# Patient Record
Sex: FEMALE | Race: OTHER | NOT HISPANIC OR LATINO
[De-identification: names, ages, dates, MRNs, and addresses within clinical notes are randomized per-mention and may not be internally consistent; named-entity substitution may affect disease eponyms.]

---

## 2017-04-13 ENCOUNTER — RESULT REVIEW (OUTPATIENT)
Age: 22
End: 2017-04-13

## 2017-08-23 ENCOUNTER — APPOINTMENT (OUTPATIENT)
Dept: PSYCHIATRY | Facility: CLINIC | Age: 22
End: 2017-08-23
Payer: COMMERCIAL

## 2017-08-23 PROCEDURE — 99214 OFFICE O/P EST MOD 30 MIN: CPT

## 2017-09-20 ENCOUNTER — APPOINTMENT (OUTPATIENT)
Dept: PSYCHIATRY | Facility: CLINIC | Age: 22
End: 2017-09-20

## 2017-10-23 ENCOUNTER — APPOINTMENT (OUTPATIENT)
Dept: PSYCHIATRY | Facility: CLINIC | Age: 22
End: 2017-10-23
Payer: COMMERCIAL

## 2017-10-23 PROCEDURE — 99214 OFFICE O/P EST MOD 30 MIN: CPT

## 2017-11-15 ENCOUNTER — APPOINTMENT (OUTPATIENT)
Dept: PSYCHIATRY | Facility: CLINIC | Age: 22
End: 2017-11-15

## 2018-01-29 ENCOUNTER — APPOINTMENT (OUTPATIENT)
Dept: PSYCHIATRY | Facility: CLINIC | Age: 23
End: 2018-01-29
Payer: COMMERCIAL

## 2018-01-29 PROCEDURE — 99214 OFFICE O/P EST MOD 30 MIN: CPT

## 2018-02-23 ENCOUNTER — APPOINTMENT (OUTPATIENT)
Dept: PSYCHIATRY | Facility: CLINIC | Age: 23
End: 2018-02-23
Payer: COMMERCIAL

## 2018-02-23 PROCEDURE — 99214 OFFICE O/P EST MOD 30 MIN: CPT

## 2018-05-18 ENCOUNTER — APPOINTMENT (OUTPATIENT)
Dept: PSYCHIATRY | Facility: CLINIC | Age: 23
End: 2018-05-18

## 2018-07-18 ENCOUNTER — APPOINTMENT (OUTPATIENT)
Dept: PSYCHIATRY | Facility: CLINIC | Age: 23
End: 2018-07-18
Payer: COMMERCIAL

## 2018-07-18 PROCEDURE — 99214 OFFICE O/P EST MOD 30 MIN: CPT

## 2018-07-19 ENCOUNTER — APPOINTMENT (OUTPATIENT)
Dept: PSYCHIATRY | Facility: CLINIC | Age: 23
End: 2018-07-19
Payer: COMMERCIAL

## 2018-07-19 PROCEDURE — 90791 PSYCH DIAGNOSTIC EVALUATION: CPT

## 2018-07-25 ENCOUNTER — APPOINTMENT (OUTPATIENT)
Dept: PSYCHIATRY | Facility: CLINIC | Age: 23
End: 2018-07-25
Payer: COMMERCIAL

## 2018-07-25 PROCEDURE — 90837 PSYTX W PT 60 MINUTES: CPT

## 2018-08-23 ENCOUNTER — APPOINTMENT (OUTPATIENT)
Dept: PSYCHIATRY | Facility: CLINIC | Age: 23
End: 2018-08-23

## 2018-08-30 ENCOUNTER — APPOINTMENT (OUTPATIENT)
Dept: PSYCHIATRY | Facility: CLINIC | Age: 23
End: 2018-08-30

## 2018-09-06 ENCOUNTER — APPOINTMENT (OUTPATIENT)
Dept: PSYCHIATRY | Facility: CLINIC | Age: 23
End: 2018-09-06

## 2018-10-10 ENCOUNTER — APPOINTMENT (OUTPATIENT)
Dept: PSYCHIATRY | Facility: CLINIC | Age: 23
End: 2018-10-10
Payer: COMMERCIAL

## 2018-10-10 PROCEDURE — 99214 OFFICE O/P EST MOD 30 MIN: CPT

## 2019-01-11 ENCOUNTER — APPOINTMENT (OUTPATIENT)
Dept: PSYCHIATRY | Facility: CLINIC | Age: 24
End: 2019-01-11
Payer: COMMERCIAL

## 2019-01-11 PROCEDURE — 99214 OFFICE O/P EST MOD 30 MIN: CPT

## 2019-04-29 ENCOUNTER — APPOINTMENT (OUTPATIENT)
Dept: PSYCHIATRY | Facility: CLINIC | Age: 24
End: 2019-04-29
Payer: COMMERCIAL

## 2019-04-29 PROCEDURE — 99214 OFFICE O/P EST MOD 30 MIN: CPT

## 2019-07-29 ENCOUNTER — APPOINTMENT (OUTPATIENT)
Dept: PSYCHIATRY | Facility: CLINIC | Age: 24
End: 2019-07-29
Payer: COMMERCIAL

## 2019-07-29 PROCEDURE — 99214 OFFICE O/P EST MOD 30 MIN: CPT

## 2019-08-27 ENCOUNTER — APPOINTMENT (OUTPATIENT)
Dept: PSYCHIATRY | Facility: CLINIC | Age: 24
End: 2019-08-27
Payer: COMMERCIAL

## 2019-08-27 PROCEDURE — 99214 OFFICE O/P EST MOD 30 MIN: CPT

## 2019-10-14 ENCOUNTER — APPOINTMENT (OUTPATIENT)
Dept: PSYCHIATRY | Facility: CLINIC | Age: 24
End: 2019-10-14
Payer: COMMERCIAL

## 2019-10-14 PROCEDURE — 99214 OFFICE O/P EST MOD 30 MIN: CPT

## 2019-12-24 ENCOUNTER — APPOINTMENT (OUTPATIENT)
Dept: PSYCHIATRY | Facility: CLINIC | Age: 24
End: 2019-12-24

## 2019-12-26 ENCOUNTER — APPOINTMENT (OUTPATIENT)
Dept: PSYCHIATRY | Facility: CLINIC | Age: 24
End: 2019-12-26
Payer: COMMERCIAL

## 2019-12-26 PROCEDURE — 99214 OFFICE O/P EST MOD 30 MIN: CPT

## 2020-02-10 ENCOUNTER — OUTPATIENT (OUTPATIENT)
Dept: OUTPATIENT SERVICES | Facility: HOSPITAL | Age: 25
LOS: 1 days | End: 2020-02-10
Payer: COMMERCIAL

## 2020-02-10 VITALS
DIASTOLIC BLOOD PRESSURE: 67 MMHG | WEIGHT: 190.04 LBS | HEIGHT: 68 IN | SYSTOLIC BLOOD PRESSURE: 105 MMHG | TEMPERATURE: 98 F | OXYGEN SATURATION: 98 % | RESPIRATION RATE: 14 BRPM | HEART RATE: 54 BPM

## 2020-02-10 DIAGNOSIS — M24.272 DISORDER OF LIGAMENT, LEFT ANKLE: ICD-10-CM

## 2020-02-10 DIAGNOSIS — D18.00 HEMANGIOMA UNSPECIFIED SITE: Chronic | ICD-10-CM

## 2020-02-10 DIAGNOSIS — Z01.818 ENCOUNTER FOR OTHER PREPROCEDURAL EXAMINATION: ICD-10-CM

## 2020-02-10 DIAGNOSIS — Z98.890 OTHER SPECIFIED POSTPROCEDURAL STATES: Chronic | ICD-10-CM

## 2020-02-10 DIAGNOSIS — M25.372 OTHER INSTABILITY, LEFT ANKLE: ICD-10-CM

## 2020-02-10 PROCEDURE — G0463: CPT

## 2020-02-10 RX ORDER — LIDOCAINE HCL 20 MG/ML
0.2 VIAL (ML) INJECTION ONCE
Refills: 0 | Status: DISCONTINUED | OUTPATIENT
Start: 2020-02-13 | End: 2020-03-03

## 2020-02-10 RX ORDER — SODIUM CHLORIDE 9 MG/ML
3 INJECTION INTRAMUSCULAR; INTRAVENOUS; SUBCUTANEOUS EVERY 8 HOURS
Refills: 0 | Status: DISCONTINUED | OUTPATIENT
Start: 2020-02-13 | End: 2020-03-03

## 2020-02-10 NOTE — H&P PST ADULT - NEUROLOGICAL DETAILS
normal strength/responds to verbal commands/responds to pain/sensation intact/alert and oriented x 3

## 2020-02-10 NOTE — H&P PST ADULT - NSICDXPROBLEM_GEN_ALL_CORE_FT
PROBLEM DIAGNOSES  Problem: Disorder of ligament, left ankle  Assessment and Plan: Scheduled for repar of anterior talofibular ligament and calcaneofibular left ankle with augmentation.  Preop instructions given.  Chlorhexidine to affected area preop  POCT, urine for pregnancy upon admission to [facility]

## 2020-02-10 NOTE — H&P PST ADULT - NSICDXPASTSURGICALHX_GEN_ALL_CORE_FT
PAST SURGICAL HISTORY:  H/O ovarian cystectomy 2017    Hemangioendothelioma, benign X3, right pinky    History of bunionectomy right foot

## 2020-02-10 NOTE — H&P PST ADULT - NSANTHOSAYNRD_GEN_A_CORE
No. PRASHANTH screening performed.  STOP BANG Legend: 0-2 = LOW Risk; 3-4 = INTERMEDIATE Risk; 5-8 = HIGH Risk

## 2020-02-10 NOTE — H&P PST ADULT - HISTORY OF PRESENT ILLNESS
Ms. Ochoa is a 24 year old woman with PMH depression with anxiety and bipolar depression on Lithium who sprained left ankle the end of November 2019 worsening of pain over the last two months now scheduled for repair of anterior talofibular ligament and calcaneofibular left ankle with augmentation.

## 2020-02-12 ENCOUNTER — TRANSCRIPTION ENCOUNTER (OUTPATIENT)
Age: 25
End: 2020-02-12

## 2020-02-13 ENCOUNTER — OUTPATIENT (OUTPATIENT)
Dept: OUTPATIENT SERVICES | Facility: HOSPITAL | Age: 25
LOS: 1 days | End: 2020-02-13
Payer: COMMERCIAL

## 2020-02-13 VITALS
HEART RATE: 61 BPM | WEIGHT: 190.04 LBS | RESPIRATION RATE: 16 BRPM | OXYGEN SATURATION: 100 % | HEIGHT: 68 IN | SYSTOLIC BLOOD PRESSURE: 103 MMHG | DIASTOLIC BLOOD PRESSURE: 70 MMHG | TEMPERATURE: 98 F

## 2020-02-13 VITALS
RESPIRATION RATE: 16 BRPM | OXYGEN SATURATION: 100 % | HEART RATE: 54 BPM | SYSTOLIC BLOOD PRESSURE: 113 MMHG | TEMPERATURE: 99 F | DIASTOLIC BLOOD PRESSURE: 67 MMHG

## 2020-02-13 DIAGNOSIS — D18.00 HEMANGIOMA UNSPECIFIED SITE: Chronic | ICD-10-CM

## 2020-02-13 DIAGNOSIS — M24.272 DISORDER OF LIGAMENT, LEFT ANKLE: ICD-10-CM

## 2020-02-13 DIAGNOSIS — S93.402A SPRAIN OF UNSPECIFIED LIGAMENT OF LEFT ANKLE, INITIAL ENCOUNTER: ICD-10-CM

## 2020-02-13 DIAGNOSIS — Z98.890 OTHER SPECIFIED POSTPROCEDURAL STATES: Chronic | ICD-10-CM

## 2020-02-13 DIAGNOSIS — Z01.818 ENCOUNTER FOR OTHER PREPROCEDURAL EXAMINATION: ICD-10-CM

## 2020-02-13 DIAGNOSIS — M25.372 OTHER INSTABILITY, LEFT ANKLE: ICD-10-CM

## 2020-02-13 PROCEDURE — C1889: CPT

## 2020-02-13 PROCEDURE — C1713: CPT

## 2020-02-13 PROCEDURE — 27695 REPAIR OF ANKLE LIGAMENT: CPT | Mod: LT

## 2020-02-13 RX ORDER — CEFAZOLIN SODIUM 1 G
2000 VIAL (EA) INJECTION ONCE
Refills: 0 | Status: COMPLETED | OUTPATIENT
Start: 2020-02-13 | End: 2020-02-13

## 2020-02-13 RX ORDER — ACETAMINOPHEN 500 MG
1000 TABLET ORAL ONCE
Refills: 0 | Status: COMPLETED | OUTPATIENT
Start: 2020-02-13 | End: 2020-02-13

## 2020-02-13 RX ORDER — SODIUM CHLORIDE 9 MG/ML
1000 INJECTION, SOLUTION INTRAVENOUS
Refills: 0 | Status: DISCONTINUED | OUTPATIENT
Start: 2020-02-13 | End: 2020-03-03

## 2020-02-13 RX ORDER — ONDANSETRON 8 MG/1
4 TABLET, FILM COATED ORAL ONCE
Refills: 0 | Status: DISCONTINUED | OUTPATIENT
Start: 2020-02-13 | End: 2020-03-03

## 2020-02-13 RX ORDER — CELECOXIB 200 MG/1
200 CAPSULE ORAL ONCE
Refills: 0 | Status: COMPLETED | OUTPATIENT
Start: 2020-02-13 | End: 2020-02-13

## 2020-02-13 RX ORDER — CHLORHEXIDINE GLUCONATE 213 G/1000ML
1 SOLUTION TOPICAL ONCE
Refills: 0 | Status: COMPLETED | OUTPATIENT
Start: 2020-02-13 | End: 2020-02-13

## 2020-02-13 RX ORDER — OXYCODONE HYDROCHLORIDE 5 MG/1
5 TABLET ORAL ONCE
Refills: 0 | Status: DISCONTINUED | OUTPATIENT
Start: 2020-02-13 | End: 2020-02-13

## 2020-02-13 RX ADMIN — Medication 1000 MILLIGRAM(S): at 07:43

## 2020-02-13 RX ADMIN — CHLORHEXIDINE GLUCONATE 1 APPLICATION(S): 213 SOLUTION TOPICAL at 07:43

## 2020-02-13 RX ADMIN — CELECOXIB 200 MILLIGRAM(S): 200 CAPSULE ORAL at 07:43

## 2020-02-13 NOTE — ASU DISCHARGE PLAN (ADULT/PEDIATRIC) - CALL YOUR DOCTOR IF YOU HAVE ANY OF THE FOLLOWING:
Numbness, tingling, color or temperature change to extremity/Pain not relieved by Medications/Swelling that gets worse/Fever greater than (need to indicate Fahrenheit or Celsius)/Bleeding that does not stop

## 2020-02-13 NOTE — ASU PATIENT PROFILE, ADULT - PSH
H/O ovarian cystectomy  2017  Hemangioendothelioma, benign  X3, right pinky  History of bunionectomy  right foot

## 2020-02-13 NOTE — ASU DISCHARGE PLAN (ADULT/PEDIATRIC) - ASU DC SPECIAL INSTRUCTIONSFT
Keep left leg splint clean, dry, and intact until follow up visit  Nonweightbearing to L leg at all times  Take pain meds as needed, (sent to pharmacy)  Follow up w/ Dr. Solomon within 1 week

## 2020-03-23 ENCOUNTER — APPOINTMENT (OUTPATIENT)
Dept: PSYCHIATRY | Facility: CLINIC | Age: 25
End: 2020-03-23
Payer: COMMERCIAL

## 2020-03-23 PROCEDURE — 99443: CPT

## 2020-06-22 ENCOUNTER — APPOINTMENT (OUTPATIENT)
Dept: PSYCHIATRY | Facility: CLINIC | Age: 25
End: 2020-06-22

## 2020-07-23 ENCOUNTER — APPOINTMENT (OUTPATIENT)
Dept: PSYCHIATRY | Facility: CLINIC | Age: 25
End: 2020-07-23
Payer: COMMERCIAL

## 2020-07-23 PROCEDURE — 99214 OFFICE O/P EST MOD 30 MIN: CPT

## 2020-09-17 ENCOUNTER — APPOINTMENT (OUTPATIENT)
Dept: PSYCHIATRY | Facility: CLINIC | Age: 25
End: 2020-09-17

## 2020-09-21 ENCOUNTER — APPOINTMENT (OUTPATIENT)
Dept: PSYCHIATRY | Facility: CLINIC | Age: 25
End: 2020-09-21
Payer: COMMERCIAL

## 2020-09-21 PROCEDURE — 99214 OFFICE O/P EST MOD 30 MIN: CPT

## 2020-11-02 LAB
24R-OH-CALCIDIOL SERPL-MCNC: 56.7 PG/ML
ALBUMIN SERPL ELPH-MCNC: 4.5 G/DL
ALP BLD-CCNC: 74 U/L
ALT SERPL-CCNC: 11 U/L
ANION GAP SERPL CALC-SCNC: 10 MMOL/L
AST SERPL-CCNC: 13 U/L
BASOPHILS # BLD AUTO: 0.05 K/UL
BASOPHILS NFR BLD AUTO: 0.7 %
BILIRUB SERPL-MCNC: 0.6 MG/DL
BUN SERPL-MCNC: 13 MG/DL
CALCIUM SERPL-MCNC: 9.8 MG/DL
CHLORIDE SERPL-SCNC: 106 MMOL/L
CHOLEST SERPL-MCNC: 148 MG/DL
CO2 SERPL-SCNC: 22 MMOL/L
CREAT SERPL-MCNC: 0.78 MG/DL
EOSINOPHIL # BLD AUTO: 0.19 K/UL
EOSINOPHIL NFR BLD AUTO: 2.7 %
ESTIMATED AVERAGE GLUCOSE: 82 MG/DL
GLUCOSE SERPL-MCNC: 83 MG/DL
HBA1C MFR BLD HPLC: 4.5 %
HCT VFR BLD CALC: 40 %
HDLC SERPL-MCNC: 64 MG/DL
HGB BLD-MCNC: 13.8 G/DL
IMM GRANULOCYTES NFR BLD AUTO: 0.3 %
LDLC SERPL CALC-MCNC: 75 MG/DL
LITHIUM SERPL-SCNC: 0.79 MMOL/L
LYMPHOCYTES # BLD AUTO: 1.99 K/UL
LYMPHOCYTES NFR BLD AUTO: 28.3 %
MAN DIFF?: NORMAL
MCHC RBC-ENTMCNC: 29.9 PG
MCHC RBC-ENTMCNC: 34.5 GM/DL
MCV RBC AUTO: 86.8 FL
MONOCYTES # BLD AUTO: 0.62 K/UL
MONOCYTES NFR BLD AUTO: 8.8 %
NEUTROPHILS # BLD AUTO: 4.16 K/UL
NEUTROPHILS NFR BLD AUTO: 59.2 %
NONHDLC SERPL-MCNC: 83 MG/DL
PLATELET # BLD AUTO: 357 K/UL
POTASSIUM SERPL-SCNC: 4.4 MMOL/L
PROT SERPL-MCNC: 7.2 G/DL
RBC # BLD: 4.61 M/UL
RBC # FLD: 11.8 %
SODIUM SERPL-SCNC: 138 MMOL/L
TRIGL SERPL-MCNC: 40 MG/DL
TSH SERPL-ACNC: 1.56 UIU/ML
WBC # FLD AUTO: 7.03 K/UL

## 2020-11-12 ENCOUNTER — APPOINTMENT (OUTPATIENT)
Dept: OBGYN | Facility: CLINIC | Age: 25
End: 2020-11-12
Payer: COMMERCIAL

## 2020-11-12 VITALS
TEMPERATURE: 98.3 F | DIASTOLIC BLOOD PRESSURE: 80 MMHG | WEIGHT: 192 LBS | BODY MASS INDEX: 29.1 KG/M2 | HEIGHT: 68 IN | HEART RATE: 62 BPM | SYSTOLIC BLOOD PRESSURE: 121 MMHG

## 2020-11-12 DIAGNOSIS — Z87.42 PERSONAL HISTORY OF OTHER DISEASES OF THE FEMALE GENITAL TRACT: ICD-10-CM

## 2020-11-12 DIAGNOSIS — Z83.49 FAMILY HISTORY OF OTHER ENDOCRINE, NUTRITIONAL AND METABOLIC DISEASES: ICD-10-CM

## 2020-11-12 DIAGNOSIS — Z82.49 FAMILY HISTORY OF ISCHEMIC HEART DISEASE AND OTHER DISEASES OF THE CIRCULATORY SYSTEM: ICD-10-CM

## 2020-11-12 PROCEDURE — 99072 ADDL SUPL MATRL&STAF TM PHE: CPT

## 2020-11-12 PROCEDURE — 99385 PREV VISIT NEW AGE 18-39: CPT

## 2020-11-12 RX ORDER — NORETHINDRONE ACETATE AND ETHINYL ESTRADIOL AND FERROUS FUMARATE 1MG-20(21)
1-20 KIT ORAL
Qty: 28 | Refills: 0 | Status: DISCONTINUED | COMMUNITY
Start: 2017-11-21 | End: 2020-11-12

## 2020-11-16 LAB
C TRACH RRNA SPEC QL NAA+PROBE: NOT DETECTED
CANDIDA VAG CYTO: NOT DETECTED
CYTOLOGY CVX/VAG DOC THIN PREP: NORMAL
ESTIMATED AVERAGE GLUCOSE: 85 MG/DL
G VAGINALIS+PREV SP MTYP VAG QL MICRO: NOT DETECTED
HBA1C MFR BLD HPLC: 4.6 %
HBV SURFACE AG SER QL: NONREACTIVE
HCV AB SER QL: NONREACTIVE
HCV S/CO RATIO: 0.12 S/CO
HIV1+2 AB SPEC QL IA.RAPID: NONREACTIVE
N GONORRHOEA RRNA SPEC QL NAA+PROBE: NOT DETECTED
SOURCE AMPLIFICATION: NORMAL
T PALLIDUM AB SER QL IA: NEGATIVE
T VAGINALIS VAG QL WET PREP: NOT DETECTED

## 2020-12-04 NOTE — HISTORY OF PRESENT ILLNESS
[TextBox_4] : 25 y.o Presents for [facility] Woman GYN exam. No complaints. Sexually active with one male partner. Regular menses.\par Hx of Ovarian cyst, resolved 2018\par

## 2021-01-12 ENCOUNTER — NON-APPOINTMENT (OUTPATIENT)
Age: 26
End: 2021-01-12

## 2021-01-13 ENCOUNTER — NON-APPOINTMENT (OUTPATIENT)
Age: 26
End: 2021-01-13

## 2021-01-14 ENCOUNTER — NON-APPOINTMENT (OUTPATIENT)
Age: 26
End: 2021-01-14

## 2021-01-28 ENCOUNTER — APPOINTMENT (OUTPATIENT)
Dept: OBGYN | Facility: CLINIC | Age: 26
End: 2021-01-28
Payer: COMMERCIAL

## 2021-01-28 VITALS
BODY MASS INDEX: 28.64 KG/M2 | WEIGHT: 189 LBS | SYSTOLIC BLOOD PRESSURE: 110 MMHG | DIASTOLIC BLOOD PRESSURE: 73 MMHG | HEIGHT: 68 IN | HEART RATE: 82 BPM | TEMPERATURE: 97.9 F

## 2021-01-28 DIAGNOSIS — Z86.59 PERSONAL HISTORY OF OTHER MENTAL AND BEHAVIORAL DISORDERS: ICD-10-CM

## 2021-01-28 DIAGNOSIS — Z30.9 ENCOUNTER FOR CONTRACEPTIVE MANAGEMENT, UNSPECIFIED: ICD-10-CM

## 2021-01-28 PROCEDURE — 99213 OFFICE O/P EST LOW 20 MIN: CPT

## 2021-01-28 PROCEDURE — 99072 ADDL SUPL MATRL&STAF TM PHE: CPT

## 2021-02-12 NOTE — PRE-ANESTHESIA EVALUATION ADULT - WEIGHT IN KG
Subjective   Patient ID: Urmila is a 29 year old female.    Chief Complaint   Patient presents with   • PPD Reading   • PrePlacement Physical     Patient presents for a pre-employment physical and will be employed as  at [employer], where she has already been employed.  States UTD on vaccinations, no records available.   No past medical history.  No questions, concerns or complaints today.        No past medical history on file.    MEDICATIONS:  No current outpatient medications on file.     No current facility-administered medications for this visit.        ALLERGIES:  ALLERGIES:  No Known Allergies    PAST SURGICAL HISTORY:  No past surgical history on file.    FAMILY HISTORY:  No family history on file.    SOCIAL HISTORY:  Social History     Tobacco Use   • Smoking status: Not on file   Substance Use Topics   • Alcohol use: Not on file   • Drug use: Not on file         Patient's medications, allergies, past medical, surgical, social and family histories were reviewed and updated as appropriate.  Patient's medications, allergies, past medical, surgical, and social history  were reviewed and updated as appropriate.    Review of Systems   Constitutional: Negative.    HENT: Negative.    Eyes: Negative.    Respiratory: Negative.    Cardiovascular: Negative.    Gastrointestinal: Negative.    Genitourinary: Negative.    Musculoskeletal: Negative.    Skin: Negative.    Neurological: Negative.    Psychiatric/Behavioral: Negative.        Objective   Physical Exam  Vitals signs reviewed.   Constitutional:       Appearance: Normal appearance. She is well-developed.   HENT:      Head: Normocephalic and atraumatic.      Right Ear: Hearing, tympanic membrane, ear canal and external ear normal.      Left Ear: Hearing, tympanic membrane, ear canal and external ear normal.      Nose: Nose normal.      Mouth/Throat:      Pharynx: Uvula midline.   Eyes:      General: Lids are normal.      Conjunctiva/sclera:  Conjunctivae normal.      Pupils: Pupils are equal, round, and reactive to light.   Neck:      Musculoskeletal: Full passive range of motion without pain.   Cardiovascular:      Rate and Rhythm: Normal rate and regular rhythm.      Pulses:           Radial pulses are 2+ on the right side and 2+ on the left side.        Dorsalis pedis pulses are 2+ on the right side and 2+ on the left side.      Heart sounds: Normal heart sounds, S1 normal and S2 normal. No murmur.   Pulmonary:      Effort: Pulmonary effort is normal.      Breath sounds: Normal breath sounds.   Abdominal:      General: Bowel sounds are normal.      Palpations: Abdomen is soft.      Tenderness: There is no abdominal tenderness.   Genitourinary:     Comments: Deferred to PCP  Musculoskeletal:      Right shoulder: Normal.      Left shoulder: Normal.      Right wrist: Normal.      Left wrist: Normal.      Right knee: Normal.      Left knee: Normal.      Right ankle: Normal.      Left ankle: Normal.      Cervical back: Normal.      Thoracic back: Normal.      Lumbar back: Normal.   Lymphadenopathy:      Cervical: No cervical adenopathy.   Skin:     General: Skin is warm and dry.      Findings: No rash.   Neurological:      Mental Status: She is alert and oriented to person, place, and time.      Cranial Nerves: No cranial nerve deficit.      Sensory: No sensory deficit.      Deep Tendon Reflexes: Reflexes are normal and symmetric.   Psychiatric:         Speech: Speech normal.         Behavior: Behavior normal.       Visit Vitals  /84   Pulse 84   Temp 97.2 °F (36.2 °C)   Resp 18   Ht 5' 1\" (1.549 m)   Wt 68 kg (150 lb)   LMP 02/09/2021 (Exact Date)   SpO2 100%   BMI 28.34 kg/m²       Assessment   Problem List Items Addressed This Visit     None      Visit Diagnoses     Encounter for PPD skin test reading    -  Primary    Physical exam, pre-employment              This is a 29 year old year-old female who presents with for a pre-employment physical  and TB reading.   See procedure tab for result.    PRE-EMPLOYMENT PHYSICAL    - Patient is cleared for employment.  SEE SCANNED FORM for allergies, medications, social history, family history, PMH and PSH.  Pre-employment physical scanned into patient medical record.   - Patient states she UTD on vaccinations.  Should vaccines be required by employer, patient may return to clinic.  States they already have her records on file at employer.  - Form completed and given to patient.  Advised patient to continue to follow up with PCP for annual wellness examinations, preventative exams and lab work.    Instructions provided as documented in the AVS.    Thank you for visiting [facility].      Signed: Terrell Hong, CNP     86.2

## 2021-03-01 ENCOUNTER — APPOINTMENT (OUTPATIENT)
Dept: PSYCHIATRY | Facility: CLINIC | Age: 26
End: 2021-03-01
Payer: COMMERCIAL

## 2021-03-01 DIAGNOSIS — F32.9 MAJOR DEPRESSIVE DISORDER, SINGLE EPISODE, UNSPECIFIED: ICD-10-CM

## 2021-03-01 PROCEDURE — 99072 ADDL SUPL MATRL&STAF TM PHE: CPT

## 2021-03-01 PROCEDURE — 99214 OFFICE O/P EST MOD 30 MIN: CPT

## 2021-04-05 PROBLEM — Z86.59 HISTORY OF BIPOLAR DISORDER: Status: RESOLVED | Noted: 2021-04-05 | Resolved: 2021-04-05

## 2021-04-05 NOTE — HISTORY OF PRESENT ILLNESS
[FreeTextEntry1] : 25 y/o F on OCP for control of ovarian cysts here for f/u of birth control. Previously on Lo/lo estrin and insurance no longer covering. Would like to try other low dose medications. Fear of worsening depression with OCP. following with psychiatrist.

## 2021-07-23 RX ORDER — NORETHINDRONE ACETATE AND ETHINYL ESTRADIOL, ETHINYL ESTRADIOL AND FERROUS FUMARATE 1MG-10(24)
1 MG-10 MCG / KIT ORAL DAILY
Qty: 3 | Refills: 3 | Status: DISCONTINUED | COMMUNITY
Start: 2020-11-12 | End: 2021-07-23

## 2021-10-07 ENCOUNTER — APPOINTMENT (OUTPATIENT)
Dept: PSYCHIATRY | Facility: CLINIC | Age: 26
End: 2021-10-07
Payer: COMMERCIAL

## 2021-10-07 PROCEDURE — 99214 OFFICE O/P EST MOD 30 MIN: CPT

## 2021-11-14 ENCOUNTER — NON-APPOINTMENT (OUTPATIENT)
Age: 26
End: 2021-11-14

## 2021-11-15 ENCOUNTER — RESULT REVIEW (OUTPATIENT)
Age: 26
End: 2021-11-15

## 2021-11-15 ENCOUNTER — APPOINTMENT (OUTPATIENT)
Dept: OBGYN | Facility: CLINIC | Age: 26
End: 2021-11-15
Payer: COMMERCIAL

## 2021-11-15 VITALS
SYSTOLIC BLOOD PRESSURE: 97 MMHG | BODY MASS INDEX: 27.59 KG/M2 | HEIGHT: 68 IN | WEIGHT: 182.06 LBS | DIASTOLIC BLOOD PRESSURE: 65 MMHG | HEART RATE: 52 BPM

## 2021-11-15 DIAGNOSIS — N63.10 UNSPECIFIED LUMP IN THE RIGHT BREAST, UNSPECIFIED QUADRANT: ICD-10-CM

## 2021-11-15 DIAGNOSIS — Z01.419 ENCOUNTER FOR GYNECOLOGICAL EXAMINATION (GENERAL) (ROUTINE) W/OUT ABNORMAL FINDINGS: ICD-10-CM

## 2021-11-15 PROBLEM — Z87.42 HISTORY OF PCOS: Status: ACTIVE | Noted: 2021-11-15

## 2021-11-15 PROBLEM — Z82.49 FAMILY HISTORY OF HYPERTENSION: Status: ACTIVE | Noted: 2021-11-15

## 2021-11-15 PROBLEM — Z83.49 FAMILY HISTORY OF HYPERTHYROIDISM: Status: ACTIVE | Noted: 2021-11-15

## 2021-11-15 PROCEDURE — 99395 PREV VISIT EST AGE 18-39: CPT

## 2021-11-15 RX ORDER — NORETHINDRONE ACETATE AND ETHINYL ESTRADIOL AND FERROUS FUMARATE 1MG-20(21)
1-20 KIT ORAL DAILY
Qty: 1 | Refills: 5 | Status: DISCONTINUED | COMMUNITY
Start: 2021-01-28 | End: 2021-11-15

## 2021-11-15 NOTE — COUNSELING
[Nutrition/ Exercise/ Weight Management] : nutrition, exercise, weight management [Vitamins/Supplements] : vitamins/supplements [Smoking Cessation] : smoking cessation [Drugs/Alcohol] : drugs, alcohol [Breast Self Exam] : breast self exam [Contraception/ Emergency Contraception/ Safe Sexual Practices] : contraception, emergency contraception, safe sexual practices [STD (testing, results, tx)] : STD (testing, results, tx) [HPV Vaccine] : HPV Vaccine

## 2021-11-15 NOTE — PHYSICAL EXAM
[Chaperone Present] : A chaperone was present in the examining room during all aspects of the physical examination [Examination Of The Breasts] : a normal appearance [Appropriately responsive] : appropriately responsive [Alert] : alert [No Acute Distress] : no acute distress [Soft] : soft [Non-tender] : non-tender [No Lesions] : no lesions [Oriented x3] : oriented x3 [Labia Majora] : normal [Labia Minora] : normal [No Bleeding] : There was no active vaginal bleeding [Normal] : normal [Uterine Adnexae] : non-palpable [Tenderness] : nontender

## 2021-11-16 LAB
C TRACH RRNA SPEC QL NAA+PROBE: NOT DETECTED
N GONORRHOEA RRNA SPEC QL NAA+PROBE: NOT DETECTED
SOURCE AMPLIFICATION: NORMAL

## 2021-11-19 ENCOUNTER — APPOINTMENT (OUTPATIENT)
Dept: PSYCHIATRY | Facility: CLINIC | Age: 26
End: 2021-11-19
Payer: COMMERCIAL

## 2021-11-19 PROCEDURE — 99214 OFFICE O/P EST MOD 30 MIN: CPT

## 2021-12-03 ENCOUNTER — APPOINTMENT (OUTPATIENT)
Dept: ULTRASOUND IMAGING | Facility: CLINIC | Age: 26
End: 2021-12-03
Payer: COMMERCIAL

## 2021-12-03 ENCOUNTER — NON-APPOINTMENT (OUTPATIENT)
Age: 26
End: 2021-12-03

## 2021-12-03 ENCOUNTER — APPOINTMENT (OUTPATIENT)
Dept: MAMMOGRAPHY | Facility: CLINIC | Age: 26
End: 2021-12-03
Payer: COMMERCIAL

## 2021-12-03 ENCOUNTER — OUTPATIENT (OUTPATIENT)
Dept: OUTPATIENT SERVICES | Facility: HOSPITAL | Age: 26
LOS: 1 days | End: 2021-12-03
Payer: COMMERCIAL

## 2021-12-03 ENCOUNTER — RESULT REVIEW (OUTPATIENT)
Age: 26
End: 2021-12-03

## 2021-12-03 DIAGNOSIS — Z98.890 OTHER SPECIFIED POSTPROCEDURAL STATES: Chronic | ICD-10-CM

## 2021-12-03 DIAGNOSIS — N63.10 UNSPECIFIED LUMP IN THE RIGHT BREAST, UNSPECIFIED QUADRANT: ICD-10-CM

## 2021-12-03 DIAGNOSIS — D18.00 HEMANGIOMA UNSPECIFIED SITE: Chronic | ICD-10-CM

## 2021-12-03 PROCEDURE — 76642 ULTRASOUND BREAST LIMITED: CPT

## 2021-12-03 PROCEDURE — 76642 ULTRASOUND BREAST LIMITED: CPT | Mod: 26,RT

## 2021-12-06 ENCOUNTER — NON-APPOINTMENT (OUTPATIENT)
Age: 26
End: 2021-12-06

## 2022-02-11 ENCOUNTER — APPOINTMENT (OUTPATIENT)
Dept: PSYCHIATRY | Facility: CLINIC | Age: 27
End: 2022-02-11
Payer: COMMERCIAL

## 2022-02-11 PROCEDURE — 99214 OFFICE O/P EST MOD 30 MIN: CPT

## 2022-03-11 ENCOUNTER — APPOINTMENT (OUTPATIENT)
Dept: PSYCHIATRY | Facility: CLINIC | Age: 27
End: 2022-03-11

## 2022-03-14 ENCOUNTER — APPOINTMENT (OUTPATIENT)
Dept: PSYCHIATRY | Facility: CLINIC | Age: 27
End: 2022-03-14
Payer: COMMERCIAL

## 2022-03-14 DIAGNOSIS — F31.9 BIPOLAR DISORDER, UNSPECIFIED: ICD-10-CM

## 2022-03-14 DIAGNOSIS — F63.3 TRICHOTILLOMANIA: ICD-10-CM

## 2022-03-14 DIAGNOSIS — F41.0 PANIC DISORDER [EPISODIC PAROXYSMAL ANXIETY]: ICD-10-CM

## 2022-03-14 DIAGNOSIS — F41.9 ANXIETY DISORDER, UNSPECIFIED: ICD-10-CM

## 2022-03-14 LAB
ALBUMIN SERPL ELPH-MCNC: 4.3 G/DL
ALP BLD-CCNC: 41 U/L
ALT SERPL-CCNC: 10 U/L
ANION GAP SERPL CALC-SCNC: 10 MMOL/L
AST SERPL-CCNC: 16 U/L
BILIRUB SERPL-MCNC: 0.4 MG/DL
BUN SERPL-MCNC: 8 MG/DL
CALCIUM SERPL-MCNC: 9.7 MG/DL
CHLORIDE SERPL-SCNC: 105 MMOL/L
CHOLEST SERPL-MCNC: 146 MG/DL
CO2 SERPL-SCNC: 21 MMOL/L
CREAT SERPL-MCNC: 0.83 MG/DL
EGFR: 100 ML/MIN/1.73M2
GLUCOSE SERPL-MCNC: 83 MG/DL
HDLC SERPL-MCNC: 57 MG/DL
LDLC SERPL CALC-MCNC: 72 MG/DL
LITHIUM SERPL-SCNC: 1.22 MMOL/L
NONHDLC SERPL-MCNC: 89 MG/DL
POTASSIUM SERPL-SCNC: 4 MMOL/L
PROT SERPL-MCNC: 7.6 G/DL
SODIUM SERPL-SCNC: 136 MMOL/L
TRIGL SERPL-MCNC: 85 MG/DL
TSH SERPL-ACNC: 1.18 UIU/ML

## 2022-03-14 PROCEDURE — 99214 OFFICE O/P EST MOD 30 MIN: CPT | Mod: 95

## 2022-03-14 RX ORDER — CLONAZEPAM 1 MG/1
1 TABLET ORAL
Qty: 30 | Refills: 0 | Status: ACTIVE | COMMUNITY
Start: 2019-07-29 | End: 1900-01-01

## 2022-03-19 ENCOUNTER — EMERGENCY (EMERGENCY)
Facility: HOSPITAL | Age: 27
LOS: 1 days | Discharge: HOME | End: 2022-03-19
Attending: EMERGENCY MEDICINE | Admitting: EMERGENCY MEDICINE
Payer: COMMERCIAL

## 2022-03-19 VITALS
SYSTOLIC BLOOD PRESSURE: 109 MMHG | DIASTOLIC BLOOD PRESSURE: 64 MMHG | RESPIRATION RATE: 18 BRPM | OXYGEN SATURATION: 100 % | HEIGHT: 68 IN | HEART RATE: 63 BPM | WEIGHT: 175.05 LBS | TEMPERATURE: 98 F

## 2022-03-19 VITALS
TEMPERATURE: 98 F | HEART RATE: 68 BPM | RESPIRATION RATE: 16 BRPM | DIASTOLIC BLOOD PRESSURE: 74 MMHG | SYSTOLIC BLOOD PRESSURE: 125 MMHG | OXYGEN SATURATION: 98 %

## 2022-03-19 DIAGNOSIS — Z98.890 OTHER SPECIFIED POSTPROCEDURAL STATES: Chronic | ICD-10-CM

## 2022-03-19 DIAGNOSIS — D18.00 HEMANGIOMA UNSPECIFIED SITE: Chronic | ICD-10-CM

## 2022-03-19 PROCEDURE — 99284 EMERGENCY DEPT VISIT MOD MDM: CPT | Mod: 25

## 2022-03-19 PROCEDURE — 96365 THER/PROPH/DIAG IV INF INIT: CPT | Mod: XU

## 2022-03-19 PROCEDURE — 90675 RABIES VACCINE IM: CPT

## 2022-03-19 PROCEDURE — 90471 IMMUNIZATION ADMIN: CPT

## 2022-03-19 PROCEDURE — 99284 EMERGENCY DEPT VISIT MOD MDM: CPT

## 2022-03-19 PROCEDURE — 90377 RABIES IG HT&SOL HUMAN IM/SC: CPT

## 2022-03-19 PROCEDURE — 13121 CMPLX RPR S/A/L 2.6-7.5 CM: CPT

## 2022-03-19 RX ORDER — AMPICILLIN SODIUM AND SULBACTAM SODIUM 250; 125 MG/ML; MG/ML
3 INJECTION, POWDER, FOR SUSPENSION INTRAMUSCULAR; INTRAVENOUS ONCE
Refills: 0 | Status: COMPLETED | OUTPATIENT
Start: 2022-03-19 | End: 2022-03-19

## 2022-03-19 RX ORDER — LITHIUM CARBONATE 300 MG/1
2 TABLET, EXTENDED RELEASE ORAL
Qty: 0 | Refills: 0 | DISCHARGE

## 2022-03-19 RX ORDER — RABIES VACC, HUMAN DIPLOID/PF 2.5 UNIT
1 VIAL (EA) INTRAMUSCULAR ONCE
Refills: 0 | Status: COMPLETED | OUTPATIENT
Start: 2022-03-19 | End: 2022-03-19

## 2022-03-19 RX ORDER — OXYCODONE AND ACETAMINOPHEN 5; 325 MG/1; MG/1
1 TABLET ORAL ONCE
Refills: 0 | Status: DISCONTINUED | OUTPATIENT
Start: 2022-03-19 | End: 2022-03-19

## 2022-03-19 RX ORDER — CLONAZEPAM 1 MG
1 TABLET ORAL
Qty: 0 | Refills: 0 | DISCHARGE

## 2022-03-19 RX ORDER — HUMAN RABIES VIRUS IMMUNE GLOBULIN 150 [IU]/ML
1600 INJECTION, SOLUTION INTRAMUSCULAR ONCE
Refills: 0 | Status: COMPLETED | OUTPATIENT
Start: 2022-03-19 | End: 2022-03-19

## 2022-03-19 RX ADMIN — HUMAN RABIES VIRUS IMMUNE GLOBULIN 1600 UNIT(S): 150 INJECTION, SOLUTION INTRAMUSCULAR at 21:00

## 2022-03-19 RX ADMIN — Medication 1 MILLILITER(S): at 20:56

## 2022-03-19 RX ADMIN — OXYCODONE AND ACETAMINOPHEN 1 TABLET(S): 5; 325 TABLET ORAL at 22:21

## 2022-03-19 RX ADMIN — AMPICILLIN SODIUM AND SULBACTAM SODIUM 3 GRAM(S): 250; 125 INJECTION, POWDER, FOR SUSPENSION INTRAMUSCULAR; INTRAVENOUS at 22:17

## 2022-03-19 RX ADMIN — AMPICILLIN SODIUM AND SULBACTAM SODIUM 200 GRAM(S): 250; 125 INJECTION, POWDER, FOR SUSPENSION INTRAMUSCULAR; INTRAVENOUS at 21:17

## 2022-03-19 NOTE — ED PROVIDER NOTE - OBJECTIVE STATEMENT
25 y/o F with hx of bipolar d/o presents with c/o dog bite to both arms today. Pt states that she was in the dog park with her dog and was  a Thai rocha attacking a smaller Thai rocha and got bit in both arms by the bigger Thai rocha. States that the dog was owned by unable to get information of owner and dog's rabies vaccination status. Pt sustained multiple abrasions and puncture wounds to both arms, lacerations to right hand. Pt is RHD. Pt was seen in urgent care, tdap updated, rx for augmentin given, laceration or R hand sutured and sent to ED to r/o tendon involvement of left arm due to severe pain from puncture wound to L forearm - had xray of L forearm and told that no FB or fx. Denies numbness, tingling, chills, N/V, pus discharge. 27 y/o F with hx of bipolar d/o presents with c/o dog bite to both arms today. Pt states that she was in the dog park with her dog and was  a Tajik rocha attacking a smaller Tajik rocha and got bit in both arms by the bigger Taj[place] rocha. States that the dog was owned by unable to get information of owner and dog's rabies vaccination status. Pt sustained multiple abrasions and puncture wounds to both arms, lacerations to right hand. Pt is RHD. Pt was seen in urgent care, tdap updated, rx for augmentin given, copious washout with NS, and put several sutures in right volar hand, sent to ED to r/o tendon involvement of left arm due to severe pain from puncture wound to L forearm - had xray of L forearm and told that no FB or fx. Denies numbness, tingling, chills, N/V, pus discharge.

## 2022-03-19 NOTE — ED PROVIDER NOTE - PATIENT PORTAL LINK FT
You can access the FollowMyHealth Patient Portal offered by [facility] by registering at the following website: [email or web address]. By joining [employer]’s FollowMyHealth portal, you will also be able to view your health information using other applications (apps) compatible with our system.

## 2022-03-19 NOTE — ED ADULT NURSE NOTE - OBJECTIVE STATEMENT
pt Aox4 breathing equal and unlabored. Pt was bit by a dog at the dog park, unaware if dog is up to date with vaccines. Pt has wound to L forearm and R hand. pt was seen at urgent care and given prescription of antibiotics. pt has pain to LUE, positive sensation distal of injury, denies numbness and tingling. Pt up to date on tetanus vaccine. Pt awaiting eval.

## 2022-03-19 NOTE — ED PROVIDER NOTE - CARE PROVIDER_API CALL
Ehsan Fonseca)  Surgery  [address] [place] [address]  Phone: [phone]  Fax: [phone]  Follow Up Time: 1-3 Days

## 2022-03-19 NOTE — ED ADULT TRIAGE NOTE - TEMPERATURE IN CELSIUS (DEGREES C)
----- Message from Irina Siegel sent at 7/24/2020  4:25 PM CDT -----  Contact: self [phone]  Would like to get medical advice.  Symptoms (please be specific):  headache, fatigue, scratchy throat and sneezing; pt states he was exposed to someone COVID+  How long has patient had these symptoms:  3 days  Pharmacy name and phone #:  [place] [address] [employer] [employer] [phone] (Phone)  [phone] (Fax)  Any drug allergies (copy from chart):    see chart  Would the patient rather a call back or a response via [facility]?:  call back  Comments:  Pt is requesting an order to be tested for COVID. Pt was advised he would have schedule a virtual visit with a PCP.        36.9

## 2022-03-19 NOTE — ED PROVIDER NOTE - MUSCULOSKELETAL, MLM
Spine appears normal, range of motion is not limited, no muscle or joint tenderness Spine appears normal, range of motion is not limited, no muscle or joint tenderness; R arm: +5cm complex laceration volar aspect Right hand, +multiple abrasions and puncture marks to right hand and wrist, all fingers warm & mobile, distal pulses and sensation intact, cap refill<2sec, NVI, no bony tenderness or deformities noted, L arm: +bruising with ttp and puncture wound noted to ulnar aspect of mid forearm L, abrasions and puncture wounds noted to wrist, fingers warm & Mobile, able to make a fist, FROM L wrist and elbow, pulses and sensation intact, NVI

## 2022-03-19 NOTE — ED PROVIDER NOTE - NS ED ATTENDING STATEMENT MOD
This was a shared visit with the DEVIKA. I reviewed and verified the documentation and independently performed the documented:

## 2022-03-19 NOTE — PROCEDURE NOTE - ADDITIONAL PROCEDURE DETAILS
Procedure: 6cm complex laceration repair right hand, washout of puncture marks left hand.   After sterile prep/drape, gaping part of right hand wound washed out, only subcutaneous tissue present. Present sutures left intact given lack of motor deficit.   Edge of wound trimmed with scissors as it was thin and jagged. Several interrupted 5-0 chromic sutures placed, making sure to leave gaps of space between them.   Left hand puncture marks washed out. Informed patient that unclear whether she has a nerve deficit given that she was already injected with local anesthesia- but since her sensation is subjectively improving that it was unlikely. Will be able to perform a more reliable test in the office with local anesthesia not on board. She understands this and that she may require a nerve repair.   Bacitracin and dressing to be applied by ADRIANA GOMEZ. Tolerated procedure well.

## 2022-03-19 NOTE — ED PROVIDER NOTE - CLINICAL SUMMARY MEDICAL DECISION MAKING FREE TEXT BOX
25 y/o F with hx of bipolar d/o presents with c/o dog bite to both arms today.  pt at dog park and bitten on both arms by [place] rocha, unknown vaccine status and dog and owner just left park while pt getting control of bleeding from injuries. pt went to  at [facility] and noted to have left arm punture wound on left medial extensor forearm with surrounding hematoma, no active bleeding, sm intact though pt co tingling in lateral arm..  on exam sm intact in left arm, sm intact, no signs compartment syndrome.  pt also with laceration along ulnar aspec of left hand extending to flexor and extensor surface over 5th carpal  bone. pt loosely sutured at  and started on augmentin.  pt given tdap.  pt on hand with sensory intact but pain on flexion of 5th digit, ? flexor tendon partial injury.  iv abx,  Hand consult obtained and wound explored and no tendon injury reirrigated and sutured.  d/c pt on abx,  rabies ig and vaccine given.  d/c to [facility] as outpt, and return to complete rabies series.

## 2022-03-19 NOTE — CONSULT NOTE ADULT - SUBJECTIVE AND OBJECTIVE BOX
25 y/o woman bit by unknown dog at [employer] at 5pm today. She was seen at urgent care center, copious washout with NS, and put several sutures in right volar hand. XR at center as per patient with no fracture. Presented here for further evaluation. She denies motor weakness and denies numbness of digits.   Received Rabies vaccine, IV unasyn, and was started on Augmentin     PAST MEDICAL HISTORY:  Anxiety     Bipolar 1 disorder     Depression     Suicidal ideation.     PAST SURGICAL HISTORY:  H/O ovarian cystectomy 2017    Hemangioendothelioma, benign X3, right pinky    History of bunionectomy right foot.      ALLERGIES AND HOME MEDICATIONS:   Allergies:        Allergies:  	No Known Drug Allergies:   	latex: Latex, Hives, Urticaria    Home Medications:   * Incomplete Medication History as of 19-Mar-2022 19:25 documented in Structured Notes  · 	clonazePAM 1 mg oral tablet: Last Dose Taken:  , 1 tab(s) orally 3 times a day, As Needed  · 	lithium 600 mg oral capsule: Last Dose Taken:  , 2 cap(s) orally 3 times a day  · 	amoxicillin-clavulanate 875 mg-125 mg oral tablet: Last Dose Taken:  , 1 tab(s) orally every 12 hours      Exam:  right palm volar aspect 5th metacarpal region extending to ulnar aspect of hand, 6cm laceration with 2 interrupted nylon sutures; otherwise areas of gaping wound through subcutaneous tissue.  Flexor tendon function intact 5th digit, sensory: reports feeling less numbness since local anesthesia was injected at urgent care center.   Puncture marks right dorsal hand  Left hand with abrasion and puncture jovani x 2 along dorsal aspect of distal forearm.

## 2022-03-19 NOTE — ED ADULT NURSE REASSESSMENT NOTE - NS ED NURSE REASSESS COMMENT FT1
2124- Peripheral IV inserted to L AC #20, pt tolerated well. Antibiotics administered as ordered. Pt awaiting hand surgeon for further evaluation. safety maintained. Will continue to monitor. COLTON, RN

## 2022-03-19 NOTE — ED ADULT TRIAGE NOTE - CHIEF COMPLAINT QUOTE
Patient A/Ox4 with a steady gait. Was attacked by a dog at the park, does not know the dog. Sustained bite marks and lacerations to right hand and left forearm. Went to  where she received stitches but was told to come to ED because she has pain radiating up her left arm and shoulder. Patient A/Ox4 with a steady gait. Was attacked by a dog at the park, does not know the dog. Sustained bite marks and lacerations to right hand and left forearm. Went to  where she received stitches but was told to come to ED because she has pain radiating up her left arm and shoulder. Also endorses numbness to right pinky finger. Warm extremities. + pulses. Patient A/Ox4 with a steady gait. Was attacked by a dog at the park, does not know the dog. Sustained bite marks and lacerations to right hand and left forearm. Went to  where she received stitches but was told to come to ED because she has pain radiating up her left arm and shoulder. Also endorses numbness to right pinky finger. Warm extremities. + pulses. Patient states she is UTD on Tetanus vaccine.

## 2022-03-19 NOTE — ED ADULT NURSE NOTE - SUICIDE SCREENING DEPRESSION
CD ADULT Progress Note     Treatment Plan Review completed on:  10/12/18     Attendance Dates: 10/11/2018 and 10/12/18     Total # of P1 Group Sessions: 2  Total # of P2 Group Sessions: NA  Total # of P3 Group Sessions: NA  Total # of 1:1 Sessions: 0.      MONDAY TUESDAY WEDNESDAY THURSDAY FRIDAY SATURDAY SUNDAY Total   Group Therapy    2 3   5   Specialty Groups*            1:1           Family Program           [place]             Phase II             Absent           Total    2 3   5       *Specialty Groups include Mental Health Care, Assertiveness and Communication, Sobriety Maintenance Skills, Spiritual Care, Stress Management, Relapse Prevention, Family Systems.                    Learning Style:  Hands on  Verbal  Reading    Staff member contributing: KIRTI Ledesma     Received supervision:  No    Client: contributed to goals and plan    Did Client receive a copy of treatment plan/revised plan: ADITI meeting 10/15 for treatment planning    Changes to Treatment Plan: No    Client agrees with plan/revised plan: Yes    Any changes in Vulnerable Adult Status:  No    Substance Use Disorders:   305.00 (F10.10) Alcohol Use Disorder Mild  305.20 (F12.10) Cannabis Use Disorder Mild  304.40 (F15.20) Amphetamine Use Disorder Moderate  304.20 (F14.20) Cocaine Use Disorder Severe  305.10 (F17.200)  Tobacco Use Disorder Moderate     1) Care Coordination Activities:  Coordinated transition of care from LP to IOP  2) Medical, Mental Health and other appointments the client attended: None reported  3) Medication issues: None reported  4) Physical and mental health problems: None reported  5) Review and evaluation of the individual abuse prevention plan: ADITI     [facility] Risk Ratings and Data       DIMENSION 1: Acute Intoxication/Withdrawal  The client's ability to cope with withdrawal symptoms and current state of intoxication       Acute Intoxication/Withdrawal - Current Risk Factor:  0    Reporting sober date of  "9/3/18    Goals: Develop effective strategies to maintain sobriety.     Data: Client denied nor demonstrated any signs/symptomology of intoxication and/or withdrawal. Client confirmed his last use date was on 9/2/18.     DIMENSION 2:  Biomedical Conditions and Complaints  The degree to which any physical disorder would interfere with treatment for substance abuse and the client's ability to tolerate any related discomfort     Biomedical Conditions and Complaints - Current Risk Factor:  0    Goals: Disclose CD status to medical providers and follow up with medical interventions while in EOP.     Data: Client reports attending the following doctor s appointments over the past week: None. Client reports the following changes to his physical health over the past week: \"None\". Client is capable of autonomous healthcare.     Current Outpatient Prescriptions   Medication     nicotine (NICODERM CQ) 21 MG/24HR 24 hr patch     nicotine polacrilex (COMMIT) 2 MG lozenge     nicotine polacrilex (NICORETTE) 2 MG gum     omeprazole (PRILOSEC) 20 MG CR capsule     No current facility-administered medications for this encounter.      Facility-Administered Medications Ordered in Other Encounters   Medication     Self Administer Medications: Behavioral Services        DIMENSION 3:  Emotional/Behavioral/Cognitive Conditions and Complications  The degree to which any condition or complications are likely to interfere with treatment for substance abuse or with function in significant life areas and the likelihood of risk of harm to self or others.     Emotional/Behavioral - Current Risk Factor:  1    DSM-5 Diagnoses:  Unclear- hx of bipolar diagnosis, but reports he suspects he has depression. Mental health diagnoses may have been confounded by substance use. DA may be warranted after client has sufficient sobriety.     Suicide Assessment:   Risk Status    Ideation - Active thoughts of suicide Intent to follow through on suicide Plan for " "completing suicide    Yes No Yes No Yes No   Emergent  x  x  x   Urgent / Non-Emergent  x  x  x   Non- Urgent  x  x  x   No Current/Active Risk  x  x  x        Goals: Goal planning session 10/15. Learn how to apply self-care and psychotherapy to build a repertoire of daily habits that counteract PAWS, fatigue, depression, anxiety and increase resiliency and well-being.      Data: See DIAP below.     DIMENSION 4:  Readiness to Change  Consider the amount of support and encouragement necessary to keep the client involved in treatment.     Readiness to Change - Current Risk Factor:  1    Goals: Goal planning session 10/15.    Data: Client reported he moved his life forward this week by: graduating from LP, starting IOP.      DIMENSION 5:  Relapse/Continued Use/Continued Problem Potential  Consider the degree to which the client recognizes relapse issues and has the skills to prevent relapse of either substance use or mental health problems.     Relapse/Continued Use/Continued Problem Potential - Current Risk Factor:  3    Goals: Goal planning session 10/15.    Data: Client rated his few to no cravings since leaving LP. Client reported a goal to cope with cravings by attending meetings, calling supportive people, and talking with others.      DIMENSION 6:  Recovery Environment  Consider the degree to which key areas of the client's life are supportive of or antagonistic to treatment participation and recovery.     Recovery Environment - Current Risk Factor:  3    Goals: Goal planning session 10/15.    Family week dates: NA    Support group meetings attended this week: 0  .    Do you currently have a sponsor: No- wants to obtain a temporary sponsor  Did you have contact with your sponsor this week? NA    Data: Client reported his living situation is mostly supportive. Client reported he and his girlfriend have had 3 \"5 minute heated debates\", which were different than past arguments as they came to solutions and " respected each other. Client reported family members from [place] came to visit to celebrate his treatment. Client reports during the past week that he built their sober support network by calling former peers from . Client reported a plan to build their sober support network this week by attending meetings and seeking a sponsor.  Employment: Client reports he is unemployed.  Volunteerism: No.         Goals: Goal planning session 10/15. Learn how to apply self-care and psychotherapy to build a repertoire of daily habits that counteract PAWS, fatigue, depression, anxiety and increase resiliency and well-being.      Data: Client denied suicidal ideation or self-injurious behavior. Client reported no mental health symptoms this week. Client reported he made efforts this week to be assertive with family members about his need for treatment. Client reported he thinks he has more protective factors than he did a year ago, and is working to minimize the risk factors he can control.      Intervention: Client was given group time to process his graduation from  and first days at home. Counselor utilized cognitive behavioral therapy, motivational interviewing techniques. Counselor utilized validation, thought re-framing, self-esteem building and strengths-building techniques. Client participated in a skills group on risk and protective wherein he learned about various risk and protective factors and created goals to strengthen his protective factors.     Assessment: Client appears to be in the Stages of Change Model  Preparation/Determination within the stages of change model. Client appears motivated to work on sobriety, however client's history indicates a multiple instances of short term sobriety followed by relapse and return to problematic behaviors. Client appeared to understand risk and protective factors and the importance of building/strengthening protective factors and minimizing risk factors when possible.      Plan: -Continue client in Phase 1  -Meet for treatment planning 10/15    Diana Steward [facility]       Negative

## 2022-03-19 NOTE — ED ADULT NURSE NOTE - CHIEF COMPLAINT QUOTE
Patient A/Ox4 with a steady gait. [place] dog [place] the park, does not know the dog. Sustained bite marks and lacerations to right hand and left forearm. Went to  where she received stitches but was told to come to ED because she has pain radiating up her left arm and shoulder.

## 2022-03-19 NOTE — ED PROVIDER NOTE - NSFOLLOWUPINSTRUCTIONS_ED_ALL_ED_FT
Follow up with plastic surgeon with appointment for re-evaluation, ongoing care and treatment. Keep wounds clean and dry. Wound care as discussed by plastic surgeon. Apply bacitracin to wounds twice daily. Take full course of antibiotics as prescribed and motrin as needed for pain. Apply ice compresses to left forearm swelling, elevate arm. Return to ED on 3/22, 3/26 and 4/2 for rabies vaccinations. If having worsening of symptoms, wound redness/discharge/streaking, fever, vomiting, increased swelling of left forearm or other related symptoms, RETURN TO THE ER IMMEDIATELY.     Animal Bite, Adult      Animal bite wounds can be mild or serious. It is important to get medical treatment to prevent infection. Ask your doctor if you need treatment to prevent an infection that can spread from animals to humans (rabies).      Follow these instructions at home:      Wound care    •Follow instructions from your doctor about how to take care of your wound. Make sure you:  •Wash your hands with soap and water before you change your bandage (dressing). If you cannot use soap and water, use hand .      •Change your bandage as told by your doctor.      •Leave stitches (sutures), skin glue, or skin tape (adhesive) strips in place. They may need to stay in place for 2 weeks or longer. If tape strips get loose and curl up, you may trim the loose edges. Do not remove tape strips completely unless your doctor says it is okay.      •Check your wound every day for signs of infection. Check for:  •More redness, swelling, or pain.      •More fluid or blood.      •Warmth.      •Pus or a bad smell.        Medicines     •Take or apply over-the-counter and prescription medicines only as told by your doctor.      •If you were prescribed an antibiotic, take or apply it as told by your doctor. Do not stop using the antibiotic even if your wound gets better.        General instructions      •Keep the injured area raised (elevated) above the level of your heart while you are sitting or lying down.    •If directed, put ice on the injured area.  •Put ice in a plastic bag.      •Place a towel between your skin and the bag.      •Leave the ice on for 20 minutes, 2–3 times per day.        •Keep all follow-up visits as told by your doctor. This is important.        Contact a doctor if:    •You have more redness, swelling, or pain around your wound.      •Your wound feels warm to the touch.      •You have a fever or chills.      •You have a general feeling of sickness (malaise).      •You feel sick to your stomach (nauseous).      •You throw up (vomit).      •You have pain that does not get better.        Get help right away if:    •You have a red streak going away from your wound.    •You have any of these coming from your wound:  •Non-clear fluid.      •More blood.      •Pus or a bad smell.        •You have trouble moving your injured area.      •You lose feeling (have numbness) or feel tingling anywhere on your body.        Summary    •It is important to get the right medical treatment for animal bites. Treatment can help you to not get an infection. Ask your doctor if you need treatment to prevent an infection that can spread from animals to humans (rabies).      •Check your wound every day for signs of infection, such as more redness or swelling instead of less.      •If you have a red streak going away from your wound, get medical help right away.      This information is not intended to replace advice given to you by your health care provider. Make sure you discuss any questions you have with your health care provider.

## 2022-03-19 NOTE — ED PROVIDER NOTE - SKIN, MLM
Skin normal color for race, warm, dry and intact. No evidence of rash. +5cm complex laceration volar aspect Right hand, +multiple abrasions and puncture marks to right hand and wrist, left wrist and forearm

## 2022-03-19 NOTE — ED PROVIDER NOTE - ATTENDING CONTRIBUTION TO CARE
25 yo female bitten on right hand and left arm by unknown [place]ali rocha, owner unknown and vaccine status unknown.  dog not locatable. pt treated and uc and given augmentin, tdap and hand sutured but sent to er for eval of tendon/nerve injury in left arm puncture, exam not c/w either, but right hand with ? partial flexor tendon injury.  iv abx given, rabies, ig and vaccine given, hand consullted and wounds explored, cleaned thoroughly and closed.  d/c on abx, return for rabies series and fu hand as directed, continue one week augmentin

## 2022-03-19 NOTE — ED PROVIDER NOTE - PROGRESS NOTE DETAILS
Spoke with Dr. Fonseca (covering for Dr. Rosa), advised will see pt in ED for consult. Lacerations to right hand re-repaired by plastic surgeon, Dr. Fonseca in ED, cleared for d/c and f/u office in a week. As per surgeon, no concerns for tendon involvement in right hand or left arm. Reevaluated patient at bedside.  Patient feeling much improved. Wound care instructions given. advised to return on days 3,7,14 for rabies vaccinations, f/u plastics, augmentin as prescribed, motrin prn pain, elevate arm and ice compresses left forearm, warning for s/s of compartment syndrome given to pt. strict return precautions given. An opportunity to ask questions was given.  Discussed the importance of prompt, close medical follow-up.  Patient will return with any changes, concerns or persistent / worsening symptoms.  Understanding of all instructions verbalized.

## 2022-07-28 ENCOUNTER — APPOINTMENT (OUTPATIENT)
Dept: OBGYN | Facility: CLINIC | Age: 27
End: 2022-07-28

## 2022-07-28 VITALS
SYSTOLIC BLOOD PRESSURE: 110 MMHG | HEIGHT: 68 IN | BODY MASS INDEX: 26.98 KG/M2 | WEIGHT: 178 LBS | DIASTOLIC BLOOD PRESSURE: 68 MMHG | HEART RATE: 53 BPM

## 2022-07-28 DIAGNOSIS — N89.8 OTHER SPECIFIED NONINFLAMMATORY DISORDERS OF VAGINA: ICD-10-CM

## 2022-07-28 DIAGNOSIS — Z11.3 ENCOUNTER FOR SCREENING FOR INFECTIONS WITH A PREDOMINANTLY SEXUAL MODE OF TRANSMISSION: ICD-10-CM

## 2022-07-28 PROCEDURE — 99213 OFFICE O/P EST LOW 20 MIN: CPT

## 2022-07-29 ENCOUNTER — ASOB RESULT (OUTPATIENT)
Age: 27
End: 2022-07-29

## 2022-07-29 ENCOUNTER — APPOINTMENT (OUTPATIENT)
Dept: OBGYN | Facility: CLINIC | Age: 27
End: 2022-07-29

## 2022-07-29 LAB
C TRACH RRNA SPEC QL NAA+PROBE: NOT DETECTED
HBV SURFACE AG SER QL: NONREACTIVE
HCV AB SER QL: NONREACTIVE
HCV S/CO RATIO: 0.11 S/CO
HIV1+2 AB SPEC QL IA.RAPID: NONREACTIVE
N GONORRHOEA RRNA SPEC QL NAA+PROBE: NOT DETECTED
SOURCE AMPLIFICATION: NORMAL
T PALLIDUM AB SER QL IA: NEGATIVE

## 2022-07-29 PROCEDURE — 76830 TRANSVAGINAL US NON-OB: CPT

## 2022-07-31 DIAGNOSIS — B96.89 ACUTE VAGINITIS: ICD-10-CM

## 2022-07-31 DIAGNOSIS — N76.0 ACUTE VAGINITIS: ICD-10-CM

## 2022-07-31 LAB
CANDIDA VAG CYTO: NOT DETECTED
G VAGINALIS+PREV SP MTYP VAG QL MICRO: DETECTED
T VAGINALIS VAG QL WET PREP: NOT DETECTED

## 2022-10-10 NOTE — ED ADULT NURSE NOTE - CAS EDN DISCHARGE ASSESSMENT
Rifampin Pregnancy And Lactation Text: This medication is Pregnancy Category C and it isn't know if it is safe during pregnancy. It is also excreted in breast milk and should not be used if you are breast feeding. Alert and oriented to person, place and time

## 2022-12-28 ENCOUNTER — APPOINTMENT (OUTPATIENT)
Dept: GYNECOLOGIC ONCOLOGY | Facility: CLINIC | Age: 27
End: 2022-12-28

## 2023-01-11 ENCOUNTER — TRANSCRIPTION ENCOUNTER (OUTPATIENT)
Age: 28
End: 2023-01-11

## 2023-01-11 ENCOUNTER — APPOINTMENT (OUTPATIENT)
Dept: GYNECOLOGIC ONCOLOGY | Facility: CLINIC | Age: 28
End: 2023-01-11
Payer: COMMERCIAL

## 2023-01-11 ENCOUNTER — APPOINTMENT (OUTPATIENT)
Dept: GYNECOLOGIC ONCOLOGY | Facility: CLINIC | Age: 28
End: 2023-01-11

## 2023-01-11 VITALS
DIASTOLIC BLOOD PRESSURE: 78 MMHG | BODY MASS INDEX: 26.52 KG/M2 | HEIGHT: 68 IN | WEIGHT: 175 LBS | SYSTOLIC BLOOD PRESSURE: 124 MMHG

## 2023-01-11 DIAGNOSIS — Z80.41 FAMILY HISTORY OF MALIGNANT NEOPLASM OF OVARY: ICD-10-CM

## 2023-01-11 DIAGNOSIS — R10.2 PELVIC AND PERINEAL PAIN: ICD-10-CM

## 2023-01-11 PROCEDURE — 99204 OFFICE O/P NEW MOD 45 MIN: CPT | Mod: 25

## 2023-01-11 RX ORDER — NORETHINDRONE ACETATE AND ETHINYL ESTRADIOL AND FERROUS FUMARATE 1MG-20(21)
1-20 KIT ORAL DAILY
Qty: 1 | Refills: 3 | Status: DISCONTINUED | COMMUNITY
Start: 2021-07-23 | End: 2023-01-11

## 2023-01-11 RX ORDER — LITHIUM CARBONATE 300 MG/1
300 TABLET, FILM COATED, EXTENDED RELEASE ORAL
Qty: 360 | Refills: 0 | Status: DISCONTINUED | COMMUNITY
Start: 2018-01-29 | End: 2023-01-11

## 2023-01-11 RX ORDER — NORETHINDRONE ACETATE AND ETHINYL ESTRADIOL AND FERROUS FUMARATE 1MG-20(21)
1-20 KIT ORAL DAILY
Qty: 3 | Refills: 3 | Status: DISCONTINUED | COMMUNITY
Start: 2021-11-15 | End: 2023-01-11

## 2023-01-11 RX ORDER — METRONIDAZOLE 7.5 MG/G
0.75 GEL VAGINAL
Qty: 1 | Refills: 0 | Status: DISCONTINUED | COMMUNITY
Start: 2022-07-31 | End: 2023-01-11

## 2023-01-11 RX ORDER — NORETHINDRONE ACETATE AND ETHINYL ESTRADIOL AND FERROUS FUMARATE 1MG-20(21)
1-20 KIT ORAL DAILY
Qty: 1 | Refills: 5 | Status: DISCONTINUED | COMMUNITY
Start: 2021-02-05 | End: 2023-01-11

## 2023-01-12 LAB — HPV HIGH+LOW RISK DNA PNL CVX: NOT DETECTED

## 2023-01-17 LAB — CYTOLOGY CVX/VAG DOC THIN PREP: NORMAL

## 2023-01-20 NOTE — PHYSICAL EXAM
[Fully active, able to carry on all pre-disease performance without restriction] : Status 0 - Fully active, able to carry on all pre-disease performance without restriction [Normal] : No focal neurologic defects observed [Abnormal] : Adnexa(ae): Abnormal [FreeTextEntry1] : Patient was interviewed and examined with chaperone present. Name of chaperone: Sandrine Levy  [de-identified] : LLQ tenderness on deep palpation [de-identified] : no CMT [de-identified] : + left adnexal tenderness on deep palpation, no palpable adnexal mass

## 2023-01-20 NOTE — END OF VISIT
[FreeTextEntry3] : This note was written by Sandrine Rivas acting as a scribe for Dr. Ivy Whalen\par This note accurately reflects the work and decisions made by me.\par \par  [FreeTextEntry2] : This note accurately reflects the work and decisions made by me.\par

## 2023-01-20 NOTE — ASSESSMENT
[FreeTextEntry1] : 26yo G0 female with worsening pelvic pains L>R, irregular menses, PCOS, bloating. \par \par Discussed with patient the possibility of endometriosis vs GI etiology of pain. Since the patient has been having regular normal BM's recently, I suspect her pain may be GYN related. Will order MRI pelvis for further evaluation. Recommended she keep a log of her pain and bleeding pattern. Cervical pap performed today. \par \par If MRI is reassuring or shows mild endometriosis, will refer patient to see Dr. Michelle Marina for further evaluation. For now, she should continue her OCP's. \par \par All her questions and concerns were addressed today.

## 2023-01-20 NOTE — CHIEF COMPLAINT
[FreeTextEntry1] : [facility] \par [address] \[place] [address]\par \par Pelvic pain/Irregular menses/PCOS \par \par

## 2023-01-20 NOTE — REVIEW OF SYSTEMS
[Dyspareunia] : dyspareunia [Hematuria] : no hematuria [Dysuria] : no dysuria [Vaginal Discharge] : no vaginal discharge [Abn Vag Bleeding] : no abnormal vaginal bleeding [FreeTextEntry4] : pelvic pain

## 2023-01-20 NOTE — HISTORY OF PRESENT ILLNESS
[FreeTextEntry1] : 26yo G0 LMP 4 months ago with history of PCOS since 18yo self referred for a second opinion. Pt reports irregular menses with amenorrhea for months at a time. She noticed this began around 18yo when she was diagnosed with PCOS. She reports worsening pelvic pains, cramping and abdominal swelling L>R around the time she should be getting her menses. She will often have 1 day of spotting during the placebo pills. Complains of bloating with loss of appetite when she gets this pain. States the pain radiates to her lower back and is worse with intercourse. She only takes Pamprin for the pain and applies a heating pad. She has been on OCP's (changed to Junel last summer from Lo-estrin) with no improvement of her symptoms.  Admits to pain with BM's and recent constipation as well. Denies blood in stools. She is sexually active with multiple partners this past year and endorses condom use. STD testing in July was negative. She was treated for BV. Denies vag d/c, vag itch or burning. \par \par Pt is concerned she may have endometriosis. She is also concerned that her pat GM reportedly had ovarian cancer in her 20's treated with removal of ovary. History of ovarian cystectomy in 2017-reports benign path. \par \par TVUS-7/29/22-AV uterus 7.94 x 4.8 x 3.68cm, 3.01mm lining, possible endocervical polyp, FF in cul de sac, left ovary with 2.4 x 1.9 x 2.45cm unilocular simple cyst.  \par \par LPAP-Nov 2020- wnl, h/o abn paps s/p colpo (pt unsure when), History of Gardasil vaccine. \par \par

## 2023-01-31 ENCOUNTER — APPOINTMENT (OUTPATIENT)
Dept: MRI IMAGING | Facility: CLINIC | Age: 28
End: 2023-01-31
Payer: COMMERCIAL

## 2023-01-31 PROCEDURE — 72197 MRI PELVIS W/O & W/DYE: CPT

## 2023-01-31 PROCEDURE — A9585: CPT | Mod: JW

## 2023-02-01 ENCOUNTER — APPOINTMENT (OUTPATIENT)
Dept: MRI IMAGING | Facility: CLINIC | Age: 28
End: 2023-02-01

## 2023-03-02 ENCOUNTER — NON-APPOINTMENT (OUTPATIENT)
Age: 28
End: 2023-03-02

## 2023-05-04 ENCOUNTER — APPOINTMENT (OUTPATIENT)
Dept: OBGYN | Facility: CLINIC | Age: 28
End: 2023-05-04
Payer: COMMERCIAL

## 2023-05-04 VITALS
SYSTOLIC BLOOD PRESSURE: 116 MMHG | BODY MASS INDEX: 22.13 KG/M2 | DIASTOLIC BLOOD PRESSURE: 74 MMHG | WEIGHT: 146 LBS | HEIGHT: 68 IN

## 2023-05-04 DIAGNOSIS — E28.2 POLYCYSTIC OVARIAN SYNDROME: ICD-10-CM

## 2023-05-04 DIAGNOSIS — R10.2 PELVIC AND PERINEAL PAIN: ICD-10-CM

## 2023-05-04 PROCEDURE — 99214 OFFICE O/P EST MOD 30 MIN: CPT

## 2023-11-27 ENCOUNTER — APPOINTMENT (OUTPATIENT)
Dept: OBGYN | Facility: CLINIC | Age: 28
End: 2023-11-27
Payer: COMMERCIAL

## 2023-11-27 VITALS
WEIGHT: 174.13 LBS | HEIGHT: 68 IN | SYSTOLIC BLOOD PRESSURE: 124 MMHG | BODY MASS INDEX: 26.39 KG/M2 | DIASTOLIC BLOOD PRESSURE: 72 MMHG

## 2023-11-27 DIAGNOSIS — R14.0 ABDOMINAL DISTENSION (GASEOUS): ICD-10-CM

## 2023-11-27 DIAGNOSIS — Z30.41 ENCOUNTER FOR SURVEILLANCE OF CONTRACEPTIVE PILLS: ICD-10-CM

## 2023-11-27 PROCEDURE — 99214 OFFICE O/P EST MOD 30 MIN: CPT

## 2023-11-27 RX ORDER — NORETHINDRONE ACETATE AND ETHINYL ESTRADIOL 1.5; 3 MG/1; UG/1
1.5-3 TABLET ORAL DAILY
Qty: 90 | Refills: 1 | Status: ACTIVE | COMMUNITY
Start: 2022-07-28 | End: 1900-01-01

## 2023-11-27 RX ORDER — NORETHINDRONE ACETATE/ETHINYL ESTRADIOL AND FERROUS FUMARATE 1MG-20(21)
1-20 KIT ORAL
Qty: 1 | Refills: 0 | Status: DISCONTINUED | COMMUNITY
Start: 2023-05-04 | End: 2023-11-27

## 2023-12-15 ENCOUNTER — APPOINTMENT (OUTPATIENT)
Dept: GASTROENTEROLOGY | Facility: CLINIC | Age: 28
End: 2023-12-15

## 2023-12-18 ENCOUNTER — OUTPATIENT (OUTPATIENT)
Dept: OUTPATIENT SERVICES | Facility: HOSPITAL | Age: 28
LOS: 1 days | End: 2023-12-18
Payer: COMMERCIAL

## 2023-12-18 ENCOUNTER — APPOINTMENT (OUTPATIENT)
Dept: ULTRASOUND IMAGING | Facility: CLINIC | Age: 28
End: 2023-12-18
Payer: COMMERCIAL

## 2023-12-18 DIAGNOSIS — D18.00 HEMANGIOMA UNSPECIFIED SITE: Chronic | ICD-10-CM

## 2023-12-18 DIAGNOSIS — Z98.890 OTHER SPECIFIED POSTPROCEDURAL STATES: Chronic | ICD-10-CM

## 2023-12-18 DIAGNOSIS — R14.0 ABDOMINAL DISTENSION (GASEOUS): ICD-10-CM

## 2023-12-18 PROCEDURE — 76830 TRANSVAGINAL US NON-OB: CPT | Mod: 26

## 2023-12-18 PROCEDURE — 76830 TRANSVAGINAL US NON-OB: CPT

## 2024-04-08 ENCOUNTER — NON-APPOINTMENT (OUTPATIENT)
Age: 29
End: 2024-04-08

## 2024-06-12 ENCOUNTER — RX RENEWAL (OUTPATIENT)
Age: 29
End: 2024-06-12

## 2024-07-30 ENCOUNTER — APPOINTMENT (OUTPATIENT)
Dept: OBGYN | Facility: CLINIC | Age: 29
End: 2024-07-30
Payer: COMMERCIAL

## 2024-07-30 VITALS
SYSTOLIC BLOOD PRESSURE: 120 MMHG | OXYGEN SATURATION: 98 % | DIASTOLIC BLOOD PRESSURE: 70 MMHG | WEIGHT: 173 LBS | HEART RATE: 63 BPM | HEIGHT: 68 IN | TEMPERATURE: 97.4 F | BODY MASS INDEX: 26.22 KG/M2

## 2024-07-30 DIAGNOSIS — Z30.41 ENCOUNTER FOR SURVEILLANCE OF CONTRACEPTIVE PILLS: ICD-10-CM

## 2024-07-30 DIAGNOSIS — Z01.419 ENCOUNTER FOR GYNECOLOGICAL EXAMINATION (GENERAL) (ROUTINE) W/OUT ABNORMAL FINDINGS: ICD-10-CM

## 2024-07-30 PROCEDURE — 99395 PREV VISIT EST AGE 18-39: CPT

## 2025-01-23 ENCOUNTER — APPOINTMENT (OUTPATIENT)
Dept: OBGYN | Facility: CLINIC | Age: 30
End: 2025-01-23
Payer: COMMERCIAL

## 2025-01-23 VITALS
WEIGHT: 173 LBS | HEIGHT: 68 IN | DIASTOLIC BLOOD PRESSURE: 70 MMHG | BODY MASS INDEX: 26.22 KG/M2 | SYSTOLIC BLOOD PRESSURE: 116 MMHG

## 2025-01-23 DIAGNOSIS — Z30.41 ENCOUNTER FOR SURVEILLANCE OF CONTRACEPTIVE PILLS: ICD-10-CM

## 2025-01-23 PROCEDURE — 99215 OFFICE O/P EST HI 40 MIN: CPT

## 2025-01-23 RX ORDER — DROSPIRENONE AND ETHINYL ESTRADIOL 0.02-3(28)
3-0.02 KIT ORAL
Qty: 90 | Refills: 1 | Status: ACTIVE | COMMUNITY
Start: 2025-01-23 | End: 1900-01-01

## 2025-01-31 ENCOUNTER — APPOINTMENT (OUTPATIENT)
Dept: ULTRASOUND IMAGING | Facility: CLINIC | Age: 30
End: 2025-01-31
Payer: COMMERCIAL

## 2025-01-31 ENCOUNTER — OUTPATIENT (OUTPATIENT)
Dept: OUTPATIENT SERVICES | Facility: HOSPITAL | Age: 30
LOS: 1 days | End: 2025-01-31
Payer: COMMERCIAL

## 2025-01-31 DIAGNOSIS — Z98.890 OTHER SPECIFIED POSTPROCEDURAL STATES: Chronic | ICD-10-CM

## 2025-01-31 DIAGNOSIS — D18.00 HEMANGIOMA UNSPECIFIED SITE: Chronic | ICD-10-CM

## 2025-01-31 DIAGNOSIS — R14.0 ABDOMINAL DISTENSION (GASEOUS): ICD-10-CM

## 2025-01-31 PROCEDURE — 76830 TRANSVAGINAL US NON-OB: CPT

## 2025-01-31 PROCEDURE — 76830 TRANSVAGINAL US NON-OB: CPT | Mod: 26

## 2025-02-04 DIAGNOSIS — N84.1 POLYP OF CERVIX UTERI: ICD-10-CM

## 2025-05-23 ENCOUNTER — OUTPATIENT (OUTPATIENT)
Dept: OUTPATIENT SERVICES | Facility: HOSPITAL | Age: 30
LOS: 1 days | End: 2025-05-23
Payer: COMMERCIAL

## 2025-05-23 ENCOUNTER — APPOINTMENT (OUTPATIENT)
Dept: ULTRASOUND IMAGING | Facility: CLINIC | Age: 30
End: 2025-05-23
Payer: COMMERCIAL

## 2025-05-23 DIAGNOSIS — Z98.890 OTHER SPECIFIED POSTPROCEDURAL STATES: Chronic | ICD-10-CM

## 2025-05-23 DIAGNOSIS — N84.1 POLYP OF CERVIX UTERI: ICD-10-CM

## 2025-05-23 DIAGNOSIS — D18.00 HEMANGIOMA UNSPECIFIED SITE: Chronic | ICD-10-CM

## 2025-05-23 PROCEDURE — 76831 ECHO EXAM UTERUS: CPT

## 2025-05-23 PROCEDURE — 58340 CATHETER FOR HYSTEROGRAPHY: CPT

## 2025-05-23 PROCEDURE — 76831 ECHO EXAM UTERUS: CPT | Mod: 26

## 2025-06-09 ENCOUNTER — LABORATORY RESULT (OUTPATIENT)
Age: 30
End: 2025-06-09

## 2025-06-09 ENCOUNTER — APPOINTMENT (OUTPATIENT)
Dept: OBGYN | Facility: CLINIC | Age: 30
End: 2025-06-09
Payer: COMMERCIAL

## 2025-06-09 PROBLEM — Z12.4 ENCOUNTER FOR PAPANICOLAOU SMEAR FOR CERVICAL CANCER SCREENING: Status: ACTIVE | Noted: 2025-06-09

## 2025-06-09 PROBLEM — D06.9 SEVERE CERVICAL DYSPLASIA: Status: ACTIVE | Noted: 2025-06-09

## 2025-06-09 PROCEDURE — 99214 OFFICE O/P EST MOD 30 MIN: CPT

## 2025-06-10 LAB
C TRACH RRNA SPEC QL NAA+PROBE: NOT DETECTED
N GONORRHOEA RRNA SPEC QL NAA+PROBE: NOT DETECTED
SOURCE TP AMPLIFICATION: NORMAL

## 2025-06-12 LAB — CYTOLOGY CVX/VAG DOC THIN PREP: ABNORMAL

## 2025-07-04 NOTE — ASU PREOP CHECKLIST - COMMENTS
Fall approx 45min ago pta, +thinners, ccollar placed in triage, large hematoma noted to the occipital lobe and neck and butt pain.   Trauma 2 called in triage @ 1346.  Denies vision changes, LOC, headache, or nausea at this time,    Npo after 12mn 2 bottles water at 0530am today Npo after 12mn 1 bottle water at 0530am today

## 2025-08-04 ENCOUNTER — APPOINTMENT (OUTPATIENT)
Dept: OBGYN | Facility: CLINIC | Age: 30
End: 2025-08-04